# Patient Record
Sex: FEMALE | Race: WHITE | NOT HISPANIC OR LATINO | URBAN - METROPOLITAN AREA
[De-identification: names, ages, dates, MRNs, and addresses within clinical notes are randomized per-mention and may not be internally consistent; named-entity substitution may affect disease eponyms.]

---

## 2020-06-25 ENCOUNTER — WEB ENCOUNTER (OUTPATIENT)
Dept: URBAN - METROPOLITAN AREA CLINIC 96 | Facility: CLINIC | Age: 70
End: 2020-06-25

## 2021-01-13 ENCOUNTER — OFFICE VISIT (OUTPATIENT)
Dept: URBAN - METROPOLITAN AREA CLINIC 96 | Facility: CLINIC | Age: 71
End: 2021-01-13

## 2021-02-17 ENCOUNTER — OFFICE VISIT (OUTPATIENT)
Dept: URBAN - METROPOLITAN AREA CLINIC 96 | Facility: CLINIC | Age: 71
End: 2021-02-17

## 2021-02-17 RX ORDER — ESTRADIOL 0.07 MG/D
PATCH, EXTENDED RELEASE TRANSDERMAL
Qty: 0 | Refills: 0 | Status: ACTIVE | COMMUNITY
Start: 1900-01-01

## 2021-02-17 RX ORDER — FLUTICASONE PROPIONATE 50 UG/1
SPRAY, METERED NASAL
Qty: 0 | Refills: 0 | Status: ACTIVE | COMMUNITY
Start: 1900-01-01

## 2022-06-08 ENCOUNTER — WEB ENCOUNTER (OUTPATIENT)
Dept: URBAN - METROPOLITAN AREA CLINIC 96 | Facility: CLINIC | Age: 72
End: 2022-06-08

## 2022-08-01 ENCOUNTER — OFFICE VISIT (OUTPATIENT)
Dept: URBAN - METROPOLITAN AREA TELEHEALTH 2 | Facility: TELEHEALTH | Age: 72
End: 2022-08-01

## 2022-08-03 ENCOUNTER — WEB ENCOUNTER (OUTPATIENT)
Dept: URBAN - METROPOLITAN AREA TELEHEALTH 2 | Facility: TELEHEALTH | Age: 72
End: 2022-08-03

## 2022-08-05 ENCOUNTER — OFFICE VISIT (OUTPATIENT)
Dept: URBAN - METROPOLITAN AREA TELEHEALTH 2 | Facility: TELEHEALTH | Age: 72
End: 2022-08-05
Payer: MEDICARE

## 2022-08-05 VITALS — WEIGHT: 118 LBS | BODY MASS INDEX: 18.96 KG/M2 | HEIGHT: 66 IN

## 2022-08-05 DIAGNOSIS — Z78.9 DRUG INTOLERANCE: ICD-10-CM

## 2022-08-05 DIAGNOSIS — K76.0 FATTY LIVER: ICD-10-CM

## 2022-08-05 DIAGNOSIS — Z12.11 SCREEN FOR COLON CANCER: ICD-10-CM

## 2022-08-05 DIAGNOSIS — K21.9 GASTROESOPHAGEAL REFLUX DISEASE, UNSPECIFIED WHETHER ESOPHAGITIS PRESENT: ICD-10-CM

## 2022-08-05 PROCEDURE — 99203 OFFICE O/P NEW LOW 30 MIN: CPT | Performed by: INTERNAL MEDICINE

## 2022-08-05 PROCEDURE — 99202 OFFICE O/P NEW SF 15 MIN: CPT | Performed by: INTERNAL MEDICINE

## 2022-08-05 RX ORDER — LEVOTHYROXINE SODIUM 75 UG/1
1 TABLET IN THE MORNING ON AN EMPTY STOMACH TABLET ORAL ONCE A DAY
Status: ACTIVE | COMMUNITY

## 2022-08-05 RX ORDER — ESTRADIOL 0.07 MG/D
PATCH, EXTENDED RELEASE TRANSDERMAL
Qty: 0 | Refills: 0 | Status: ACTIVE | COMMUNITY
Start: 1900-01-01

## 2022-08-05 RX ORDER — CHOLECALCIFEROL (VITAMIN D3) 50 MCG
1 CAPSULE CAPSULE ORAL ONCE A DAY
Refills: 0 | Status: ACTIVE | COMMUNITY
Start: 1900-01-01

## 2022-08-05 RX ORDER — FLUTICASONE PROPIONATE 50 UG/1
SPRAY, METERED NASAL
Qty: 0 | Refills: 0 | Status: ACTIVE | COMMUNITY
Start: 1900-01-01

## 2022-08-05 RX ORDER — FEXOFENADINE HYDROCHLORIDE 60 MG/1
1 TABLET TABLET, FILM COATED ORAL PRN
Refills: 0 | Status: ACTIVE | COMMUNITY
Start: 1900-01-01

## 2022-08-05 RX ORDER — FLUOXETINE HYDROCHLORIDE 10 MG/1
1 CAPSULE CAPSULE ORAL ONCE A DAY
Status: ACTIVE | COMMUNITY

## 2022-08-05 NOTE — HPI-TODAY'S VISIT:
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 no kids.  has been ill. now is caretaker. last ov 11/13/2017 still gen doing well on [employer] pepcid 20mg bid did have some inc gerd spring...changed to prevacid...better, but didnt do well on the PPI w dysphoria. also had an UGI and UBT 6/2022 per PCP which were negative. restarted on her pepcid 20 mg bid and doing well. bms are qd-bid. cologuard per PCP 3/2022 was negative. plagued recently w UTIs and hematuria w clots. is seeing urology.

## 2022-08-07 PROBLEM — 59037007: Status: ACTIVE | Noted: 2022-08-07

## 2022-08-07 PROBLEM — 266900000: Status: ACTIVE | Noted: 2022-08-07

## 2022-08-07 PROBLEM — 266998003: Status: ACTIVE | Noted: 2022-08-07

## 2022-08-07 PROBLEM — 72092001: Status: ACTIVE | Noted: 2022-08-07

## 2022-08-07 PROBLEM — 371435006: Status: ACTIVE | Noted: 2022-08-05

## 2022-08-22 ENCOUNTER — WEB ENCOUNTER (OUTPATIENT)
Dept: URBAN - METROPOLITAN AREA CLINIC 96 | Facility: CLINIC | Age: 72
End: 2022-08-22

## 2022-08-22 ENCOUNTER — LAB OUTSIDE AN ENCOUNTER (OUTPATIENT)
Dept: URBAN - METROPOLITAN AREA CLINIC 96 | Facility: CLINIC | Age: 72
End: 2022-08-22

## 2022-09-09 ENCOUNTER — WEB ENCOUNTER (OUTPATIENT)
Dept: URBAN - METROPOLITAN AREA CLINIC 96 | Facility: CLINIC | Age: 72
End: 2022-09-09

## 2022-09-21 ENCOUNTER — WEB ENCOUNTER (OUTPATIENT)
Dept: URBAN - METROPOLITAN AREA CLINIC 96 | Facility: CLINIC | Age: 72
End: 2022-09-21

## 2023-03-21 ENCOUNTER — WEB ENCOUNTER (OUTPATIENT)
Dept: URBAN - METROPOLITAN AREA CLINIC 96 | Facility: CLINIC | Age: 73
End: 2023-03-21

## 2023-03-23 ENCOUNTER — WEB ENCOUNTER (OUTPATIENT)
Dept: URBAN - METROPOLITAN AREA CLINIC 96 | Facility: CLINIC | Age: 73
End: 2023-03-23

## 2023-03-23 RX ORDER — HYDROCORTISONE 25 MG/G
1 APPLICATION CREAM TOPICAL TWICE A DAY
Qty: 30 | Refills: 1 | OUTPATIENT
Start: 2023-03-23 | End: 2023-05-22

## 2023-03-24 ENCOUNTER — TELEPHONE ENCOUNTER (OUTPATIENT)
Dept: URBAN - METROPOLITAN AREA CLINIC 3 | Facility: CLINIC | Age: 73
End: 2023-03-24

## 2023-05-15 ENCOUNTER — WEB ENCOUNTER (OUTPATIENT)
Dept: URBAN - METROPOLITAN AREA CLINIC 96 | Facility: CLINIC | Age: 73
End: 2023-05-15

## 2023-05-17 ENCOUNTER — WEB ENCOUNTER (OUTPATIENT)
Dept: URBAN - METROPOLITAN AREA CLINIC 96 | Facility: CLINIC | Age: 73
End: 2023-05-17

## 2023-05-18 ENCOUNTER — WEB ENCOUNTER (OUTPATIENT)
Dept: URBAN - METROPOLITAN AREA CLINIC 96 | Facility: CLINIC | Age: 73
End: 2023-05-18

## 2023-05-23 ENCOUNTER — TELEPHONE ENCOUNTER (OUTPATIENT)
Dept: URBAN - METROPOLITAN AREA CLINIC 96 | Facility: CLINIC | Age: 73
End: 2023-05-23

## 2023-05-23 ENCOUNTER — OFFICE VISIT (OUTPATIENT)
Dept: URBAN - METROPOLITAN AREA MEDICAL CENTER 28 | Facility: MEDICAL CENTER | Age: 73
End: 2023-05-23
Payer: MEDICARE

## 2023-05-23 DIAGNOSIS — Z12.11 COLON CANCER SCREENING: ICD-10-CM

## 2023-05-23 DIAGNOSIS — K21.9 ACID REFLUX: ICD-10-CM

## 2023-05-23 DIAGNOSIS — K22.89 DILATATION OF ESOPHAGUS: ICD-10-CM

## 2023-05-23 DIAGNOSIS — D12.0 ADENOMA OF CECUM: ICD-10-CM

## 2023-05-23 PROCEDURE — 43239 EGD BIOPSY SINGLE/MULTIPLE: CPT | Performed by: INTERNAL MEDICINE

## 2023-05-23 PROCEDURE — 45380 COLONOSCOPY AND BIOPSY: CPT | Performed by: INTERNAL MEDICINE

## 2023-05-23 RX ORDER — LEVOTHYROXINE SODIUM 75 UG/1
1 TABLET IN THE MORNING ON AN EMPTY STOMACH TABLET ORAL ONCE A DAY
Status: ACTIVE | COMMUNITY

## 2023-05-23 RX ORDER — ESTRADIOL 0.07 MG/D
PATCH, EXTENDED RELEASE TRANSDERMAL
Qty: 0 | Refills: 0 | Status: ACTIVE | COMMUNITY
Start: 1900-01-01

## 2023-05-23 RX ORDER — FEXOFENADINE HYDROCHLORIDE 60 MG/1
1 TABLET TABLET, FILM COATED ORAL PRN
Refills: 0 | Status: ACTIVE | COMMUNITY
Start: 1900-01-01

## 2023-05-23 RX ORDER — HYDROCORTISONE 25 MG/G
1 APPLICATION CREAM TOPICAL TWICE A DAY
Qty: 1 | Refills: 1 | OUTPATIENT
Start: 2023-05-23 | End: 2023-07-22

## 2023-05-23 RX ORDER — CHOLECALCIFEROL (VITAMIN D3) 50 MCG
1 CAPSULE CAPSULE ORAL ONCE A DAY
Refills: 0 | Status: ACTIVE | COMMUNITY
Start: 1900-01-01

## 2023-05-23 RX ORDER — FLUOXETINE HYDROCHLORIDE 10 MG/1
1 CAPSULE CAPSULE ORAL ONCE A DAY
Status: ACTIVE | COMMUNITY

## 2023-05-23 RX ORDER — FLUTICASONE PROPIONATE 50 UG/1
SPRAY, METERED NASAL
Qty: 0 | Refills: 0 | Status: ACTIVE | COMMUNITY
Start: 1900-01-01

## 2023-06-02 ENCOUNTER — WEB ENCOUNTER (OUTPATIENT)
Dept: URBAN - METROPOLITAN AREA CLINIC 96 | Facility: CLINIC | Age: 73
End: 2023-06-02

## 2023-06-19 ENCOUNTER — OFFICE VISIT (OUTPATIENT)
Dept: URBAN - METROPOLITAN AREA SURGERY CENTER 18 | Facility: SURGERY CENTER | Age: 73
End: 2023-06-19

## 2023-08-07 ENCOUNTER — WEB ENCOUNTER (OUTPATIENT)
Dept: URBAN - METROPOLITAN AREA CLINIC 96 | Facility: CLINIC | Age: 73
End: 2023-08-07

## 2024-01-30 ENCOUNTER — OFFICE VISIT (OUTPATIENT)
Dept: URBAN - METROPOLITAN AREA CLINIC 96 | Facility: CLINIC | Age: 74
End: 2024-01-30
Payer: MEDICARE

## 2024-01-30 ENCOUNTER — TELEPHONE ENCOUNTER (OUTPATIENT)
Dept: URBAN - METROPOLITAN AREA CLINIC 96 | Facility: CLINIC | Age: 74
End: 2024-01-30

## 2024-01-30 VITALS
DIASTOLIC BLOOD PRESSURE: 81 MMHG | SYSTOLIC BLOOD PRESSURE: 138 MMHG | TEMPERATURE: 97.7 F | HEIGHT: 66 IN | WEIGHT: 118 LBS | BODY MASS INDEX: 18.96 KG/M2 | HEART RATE: 76 BPM

## 2024-01-30 DIAGNOSIS — K76.0 FATTY LIVER: ICD-10-CM

## 2024-01-30 DIAGNOSIS — I70.90 ATHEROSCLEROSIS: ICD-10-CM

## 2024-01-30 DIAGNOSIS — Z78.9 DRUG INTOLERANCE: ICD-10-CM

## 2024-01-30 DIAGNOSIS — Z83.79 FAMILY HISTORY OF PEPTIC ULCER: ICD-10-CM

## 2024-01-30 DIAGNOSIS — F19.11 HISTORY OF DRUG ABUSE: ICD-10-CM

## 2024-01-30 DIAGNOSIS — K57.90 DIVERTICULOSIS: ICD-10-CM

## 2024-01-30 DIAGNOSIS — K64.9 HEMORRHOIDS, UNSPECIFIED HEMORRHOID TYPE: ICD-10-CM

## 2024-01-30 DIAGNOSIS — Z86.010 HISTORY OF ADENOMATOUS POLYP OF COLON: ICD-10-CM

## 2024-01-30 DIAGNOSIS — Z85.51 HISTORY OF BLADDER CANCER: ICD-10-CM

## 2024-01-30 DIAGNOSIS — Z87.11 HISTORY OF PEPTIC ULCER: ICD-10-CM

## 2024-01-30 DIAGNOSIS — K21.9 GASTROESOPHAGEAL REFLUX DISEASE, UNSPECIFIED WHETHER ESOPHAGITIS PRESENT: ICD-10-CM

## 2024-01-30 PROCEDURE — 99214 OFFICE O/P EST MOD 30 MIN: CPT | Performed by: INTERNAL MEDICINE

## 2024-01-30 RX ORDER — FLUTICASONE PROPIONATE 50 UG/1
SPRAY, METERED NASAL
Qty: 0 | Refills: 0 | Status: ACTIVE | COMMUNITY
Start: 1900-01-01

## 2024-01-30 RX ORDER — LEVOTHYROXINE SODIUM 75 UG/1
1 TABLET IN THE MORNING ON AN EMPTY STOMACH TABLET ORAL ONCE A DAY
Status: ACTIVE | COMMUNITY

## 2024-01-30 RX ORDER — FAMOTIDINE 20 MG/1
1 TABLET TABLET, FILM COATED ORAL TWICE A DAY
Qty: 180 TABLET | Refills: 3 | OUTPATIENT
Start: 2024-01-30

## 2024-01-30 RX ORDER — FAMOTIDINE 20 MG/1
1 TABLET AT BEDTIME AS NEEDED TABLET, FILM COATED ORAL ONCE A DAY
Qty: 30 | OUTPATIENT
Start: 2024-01-30

## 2024-01-30 RX ORDER — FEXOFENADINE HYDROCHLORIDE 60 MG/1
1 TABLET TABLET, FILM COATED ORAL PRN
Refills: 0 | Status: ACTIVE | COMMUNITY
Start: 1900-01-01

## 2024-01-30 RX ORDER — FLUOXETINE HYDROCHLORIDE 10 MG/1
1 CAPSULE CAPSULE ORAL ONCE A DAY
Status: ACTIVE | COMMUNITY

## 2024-01-30 RX ORDER — ESTRADIOL 0.07 MG/D
PATCH, EXTENDED RELEASE TRANSDERMAL
Qty: 0 | Refills: 0 | Status: ACTIVE | COMMUNITY
Start: 1900-01-01

## 2024-01-30 RX ORDER — CHOLECALCIFEROL (VITAMIN D3) 50 MCG
1 CAPSULE CAPSULE ORAL ONCE A DAY
Refills: 0 | Status: ACTIVE | COMMUNITY
Start: 1900-01-01

## 2024-01-30 NOTE — HPI-TODAY'S VISIT:
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 very close to 2 local nieces.  has extended family local.  has been ill- spinal fusion/CABG. now is his caretaker. last ov 8/5/2022 still gen doing well on [employer] pepcid 20mg bid did have some inc gerd spring 2022...changed to prevacid...better, but didnt do well on the PPI w dysphoria. also had an UGI and UBT 6/2022 per PCP which were negative. restarted on her pepcid 20 mg bid and was doing well. occ extra tums. bms are still qd-bid-tid. cologuard per PCP 3/2022 was negative. but, colon 5/23/2023- 6mm TA. wasplagued recently w UTIs and hematuria w clots. saw urology. dx w bladder cancer 9/2022.  s/p BCG x3. hemorrhoids inc w the bladder spasms induced by the BCG.

## 2024-02-05 PROBLEM — 235595009: Status: ACTIVE | Noted: 2022-08-05

## 2024-02-05 PROBLEM — 266987004: Status: ACTIVE | Noted: 2024-02-05

## 2024-02-05 PROBLEM — 197321007: Status: ACTIVE | Noted: 2022-08-05

## 2024-02-05 PROBLEM — 429047008: Status: ACTIVE | Noted: 2024-02-05

## 2024-02-05 PROBLEM — 70153002: Status: ACTIVE | Noted: 2024-02-05

## 2024-02-05 PROBLEM — 397881000: Status: ACTIVE | Noted: 2024-02-05

## 2025-03-03 ENCOUNTER — WEB ENCOUNTER (OUTPATIENT)
Dept: URBAN - NONMETROPOLITAN AREA CLINIC 2 | Facility: CLINIC | Age: 75
End: 2025-03-03

## 2025-03-03 RX ORDER — FAMOTIDINE 20 MG/1
1 TABLET AT BEDTIME AS NEEDED TABLET, FILM COATED ORAL ONCE A DAY
Qty: 30 | Refills: 0
Start: 2024-01-30

## 2025-03-18 ENCOUNTER — OFFICE VISIT (OUTPATIENT)
Dept: URBAN - METROPOLITAN AREA CLINIC 96 | Facility: CLINIC | Age: 75
End: 2025-03-18

## 2025-06-06 ENCOUNTER — WEB ENCOUNTER (OUTPATIENT)
Dept: URBAN - METROPOLITAN AREA CLINIC 96 | Facility: CLINIC | Age: 75
End: 2025-06-06

## 2025-06-06 RX ORDER — FAMOTIDINE 20 MG/1
TAKE 1 TABLET TABLET, FILM COATED ORAL TWICE A DAY
Qty: 180 | Refills: 0 | OUTPATIENT
Start: 2025-06-14

## 2025-08-19 ENCOUNTER — OFFICE VISIT (OUTPATIENT)
Dept: URBAN - METROPOLITAN AREA CLINIC 96 | Facility: CLINIC | Age: 75
End: 2025-08-19
Payer: MEDICARE

## 2025-08-19 ENCOUNTER — TELEPHONE ENCOUNTER (OUTPATIENT)
Dept: URBAN - METROPOLITAN AREA CLINIC 96 | Facility: CLINIC | Age: 75
End: 2025-08-19

## 2025-08-19 DIAGNOSIS — Z83.79 FAMILY HISTORY OF PEPTIC ULCER: ICD-10-CM

## 2025-08-19 DIAGNOSIS — K57.90 DIVERTICULOSIS: ICD-10-CM

## 2025-08-19 DIAGNOSIS — K21.9 GASTROESOPHAGEAL REFLUX DISEASE, UNSPECIFIED WHETHER ESOPHAGITIS PRESENT: ICD-10-CM

## 2025-08-19 DIAGNOSIS — K64.9 HEMORRHOIDS, UNSPECIFIED HEMORRHOID TYPE: ICD-10-CM

## 2025-08-19 DIAGNOSIS — Z78.9 DRUG INTOLERANCE: ICD-10-CM

## 2025-08-19 DIAGNOSIS — I70.90 ATHEROSCLEROSIS: ICD-10-CM

## 2025-08-19 DIAGNOSIS — Z86.010 HISTORY OF ADENOMATOUS POLYP OF COLON: ICD-10-CM

## 2025-08-19 DIAGNOSIS — Z87.11 HISTORY OF PEPTIC ULCER: ICD-10-CM

## 2025-08-19 DIAGNOSIS — F19.11 HISTORY OF DRUG ABUSE: ICD-10-CM

## 2025-08-19 DIAGNOSIS — Z85.51 HISTORY OF BLADDER CANCER: ICD-10-CM

## 2025-08-19 DIAGNOSIS — K76.0 FATTY LIVER: ICD-10-CM

## 2025-08-19 PROCEDURE — 99214 OFFICE O/P EST MOD 30 MIN: CPT | Performed by: INTERNAL MEDICINE

## 2025-08-19 RX ORDER — FAMOTIDINE 20 MG/1
TAKE 1 TABLET TABLET, FILM COATED ORAL TWICE A DAY
Qty: 180 | Refills: 3 | OUTPATIENT
Start: 2025-08-19

## 2025-08-19 RX ORDER — FLUTICASONE PROPIONATE 50 UG/1
SPRAY, METERED NASAL
Qty: 0 | Refills: 0 | Status: ACTIVE | COMMUNITY
Start: 1900-01-01

## 2025-08-19 RX ORDER — ESTRADIOL 0.07 MG/D
PATCH, EXTENDED RELEASE TRANSDERMAL
Qty: 0 | Refills: 0 | Status: ACTIVE | COMMUNITY
Start: 1900-01-01

## 2025-08-19 RX ORDER — LEVOTHYROXINE SODIUM 75 UG/1
1 TABLET IN THE MORNING ON AN EMPTY STOMACH TABLET ORAL ONCE A DAY
Status: ACTIVE | COMMUNITY

## 2025-08-19 RX ORDER — FAMOTIDINE 20 MG/1
TAKE 1 TABLET TABLET, FILM COATED ORAL TWICE A DAY
Qty: 180 | Refills: 0 | Status: ACTIVE | COMMUNITY
Start: 2025-06-14

## 2025-08-19 RX ORDER — FLUOXETINE HYDROCHLORIDE 10 MG/1
1 CAPSULE CAPSULE ORAL ONCE A DAY
Status: ACTIVE | COMMUNITY

## 2025-08-19 RX ORDER — CHOLECALCIFEROL (VITAMIN D3) 50 MCG
1 CAPSULE CAPSULE ORAL ONCE A DAY
Refills: 0 | Status: ACTIVE | COMMUNITY
Start: 1900-01-01

## 2025-08-19 RX ORDER — FEXOFENADINE HYDROCHLORIDE 60 MG/1
1 TABLET TABLET, FILM COATED ORAL PRN
Refills: 0 | Status: ACTIVE | COMMUNITY
Start: 1900-01-01

## 2025-08-19 RX ORDER — HYDROCORTISONE ACETATE 25 MG/1
1 SUPPOSITORY SUPPOSITORY RECTAL PRN
Status: ACTIVE | COMMUNITY

## 2025-08-20 ENCOUNTER — WEB ENCOUNTER (OUTPATIENT)
Dept: URBAN - METROPOLITAN AREA CLINIC 96 | Facility: CLINIC | Age: 75
End: 2025-08-20

## 2025-08-30 ENCOUNTER — DASHBOARD ENCOUNTERS (OUTPATIENT)
Age: 75
End: 2025-08-30